# Patient Record
Sex: MALE | Race: WHITE | NOT HISPANIC OR LATINO | URBAN - NONMETROPOLITAN AREA
[De-identification: names, ages, dates, MRNs, and addresses within clinical notes are randomized per-mention and may not be internally consistent; named-entity substitution may affect disease eponyms.]

---

## 2017-05-23 NOTE — PATIENT DISCUSSION
(C18.981) Keratoconjunct sicca, not specified as Sjogren's, bilateral - Assesment : Examination revealed Dry Eye Syndrome - Plan : Monitor for changes. Advised patient to call our office with decreased vision or increased symptoms.  Use artificial tears 2-3 times a day

## 2018-05-24 NOTE — PATIENT DISCUSSION
(D99.186) Keratoconjunct sicca, not specified as Sjogren's, bilateral - Assesment : Examination revealed Dry Eye Syndrome, mild. Patient finds relief from Systane Balance - Plan : Monitor for changes. Advised patient to call our office with decreased vision or increased symptoms.  Continue Systane Balance PRN for comfort  RV 1 year EXAM

## 2018-05-24 NOTE — PATIENT DISCUSSION
(H98.154) Vitreous degeneration, bilateral - Assesment : Examination revealed PVD - Plan : Monitor for changes. Advised patient to call our office with decreased vision or an increase in flashes and/or floaters.

## 2019-01-11 NOTE — PATIENT DISCUSSION
(H10.45) Other chronic allergic conjunctivitis - Assesment : Examination revealed Allergic Conjunctivitis. PATIENT CURRENTLY USING NAPHCON A.  POLYTRIM CAUSED REDNESS. STOP NAPHCON A FEW DAYS BEFORE TESTING.   ADDENDUM: 1/17/19 PAZEO WAS TOO EXPENSIVE RX FOR PATADAY SENT TO PHARMACY - Plan : allergy testing ON MONDAY  USE ARTIFICIAL TEARS,  PF AT DROPS GIVEN TO PATIENT WILL CONSIDER PAZEO IN THE FUTURE IF NEEDED

## 2019-01-11 NOTE — PATIENT DISCUSSION
(M97.219) Keratoconjunct sicca, not specified as Sjogren's, bilateral - Assesment : Examination revealed Dry Eye Syndrome, mild. Patient finds relief from Systane Balance. CURRENTLY USING NAPHCON A. HAS USED POLYTRIM IN THE PAST BUT CAUSED REDNESS PATIENT D/C THESE - Plan : Monitor for changes. Advised patient to call our office with decreased vision or increased symptoms.  Continue Systane Balance PRN for comfort

## 2019-01-14 NOTE — PATIENT DISCUSSION
(H10.45) Other chronic allergic conjunctivitis - Assesment : Pos RXN to: dog epith, green alex, pecan,black oak, box elder, yellow dock, lambs quarter, giant ragweed, alternaria.     ADDENDUM: 1/17/19 PAZEO WAS TOO EXPENSIVE RX FOR PATADAY SENT TO PHARMACY - Plan : PATADAY OU QD .

## 2019-05-29 NOTE — PATIENT DISCUSSION
(S32.603) Keratoconjunct sicca, not specified as Sjogren's, bilateral - Assesment : Examination revealed Dry Eye Syndrome, mild. Patient finds relief from Systane Balance. - Plan : Monitor for changes. Advised patient to call our office with decreased vision or increased symptoms.  Continue Systane Balance PRN for comfort  CONTINUE NAPHCON-A AND PATADAY  1 YEAR EXAM

## 2020-01-14 ENCOUNTER — IMPORTED ENCOUNTER (OUTPATIENT)
Dept: URBAN - NONMETROPOLITAN AREA CLINIC 1 | Facility: CLINIC | Age: 70
End: 2020-01-14

## 2020-01-14 PROBLEM — H25.811: Noted: 2020-01-14

## 2020-01-14 PROBLEM — H25.813: Noted: 2020-01-14

## 2020-01-14 PROBLEM — H01.02A: Noted: 2020-01-14

## 2020-01-14 PROBLEM — H01.024: Noted: 2020-01-14

## 2020-01-14 PROBLEM — H01.021: Noted: 2020-01-14

## 2020-01-14 PROBLEM — H01.02B: Noted: 2020-01-14

## 2020-01-14 PROBLEM — H25.812: Noted: 2020-01-14

## 2020-01-14 PROCEDURE — 92002 INTRM OPH EXAM NEW PATIENT: CPT

## 2020-01-14 PROCEDURE — 92015 DETERMINE REFRACTIVE STATE: CPT

## 2021-02-16 ENCOUNTER — IMPORTED ENCOUNTER (OUTPATIENT)
Dept: URBAN - NONMETROPOLITAN AREA CLINIC 1 | Facility: CLINIC | Age: 71
End: 2021-02-16

## 2021-02-16 PROCEDURE — 99213 OFFICE O/P EST LOW 20 MIN: CPT

## 2021-02-16 PROCEDURE — 92015 DETERMINE REFRACTIVE STATE: CPT

## 2021-02-16 NOTE — PATIENT DISCUSSION
Cataracts OU- Discussed diagnosis in detail with patient- Discussed signs and symptoms of progression- Discussed UV protection- Discussed different treatment options patient states he hasnt not much change - Progression noted today OU - Continue to monitorBlepharitis  OU- Discussed diagnosis in detail with patient- Recommend patient using [employer] baby shampoo to scrub lid daily- Continue to monitorPhytoplasmas OU- Discussed diagnosis in detail with patient - Continue to monitorPresbyopia OU - Discussed diagnosis in detail with patient- New glasses RX given but not much change due to cataract progressing - Continue to monitor

## 2021-06-10 NOTE — PATIENT DISCUSSION
Hx of Ocular myasthenia gravis.  Patient had recent episode of droopy lid likely associated with Myasthenia gravis. Patient states droop resolved in ~3 days then resolved.

## 2022-02-17 ENCOUNTER — FOLLOW UP (OUTPATIENT)
Dept: URBAN - NONMETROPOLITAN AREA CLINIC 1 | Facility: CLINIC | Age: 72
End: 2022-02-17

## 2022-02-17 DIAGNOSIS — H25.813: ICD-10-CM

## 2022-02-17 DIAGNOSIS — H52.4: ICD-10-CM

## 2022-02-17 PROCEDURE — 92015 DETERMINE REFRACTIVE STATE: CPT

## 2022-02-17 PROCEDURE — 99213 OFFICE O/P EST LOW 20 MIN: CPT

## 2022-02-17 ASSESSMENT — VISUAL ACUITY
OS_CC: 20/30+1
OD_BAT: 20/400
OD_CC: 20/30-1
OS_BAT: 20/100

## 2022-02-17 ASSESSMENT — TONOMETRY
OS_IOP_MMHG: 15
OD_IOP_MMHG: 15

## 2022-02-17 NOTE — PATIENT DISCUSSION
Discussed diagnosis in detail with patient. New Glasses RX given today if patient chooses not to proceed with cataract eval. Continue to monitor.

## 2022-02-17 NOTE — PATIENT DISCUSSION
Discussed diagnosis in detail with patient. Discussed signs and symptoms of progression. Discussed UV protection. BAT done today OD 20/400 and OS 20/100. Patient complains of troublewith glare at night time. Progression noted today, recommend cataract eval with Dr. Eileen Prater. Patient wishes to wait at this time.  Continue to monitor.

## 2022-04-10 ASSESSMENT — VISUAL ACUITY
OD_GLARE: 20/32-2
OS_GLARE: 20/50-2
OS_SC: 20/29-2
OU_CC: 20/20
OD_GLARE: 20/400
OS_PH: 20/22-2
OS_GLARE: 20/100
OD_SC: 20/25-
OS_SC: 20/30+2
OD_SC: 20/22+2

## 2022-04-10 ASSESSMENT — TONOMETRY
OD_IOP_MMHG: 16
OS_IOP_MMHG: 16
OD_IOP_MMHG: 16
OS_IOP_MMHG: 16

## 2023-04-03 ENCOUNTER — PRE-OP/H&P (OUTPATIENT)
Dept: URBAN - NONMETROPOLITAN AREA CLINIC 1 | Facility: CLINIC | Age: 73
End: 2023-04-03

## 2023-04-03 VITALS
SYSTOLIC BLOOD PRESSURE: 128 MMHG | HEIGHT: 72 IN | BODY MASS INDEX: 24.11 KG/M2 | DIASTOLIC BLOOD PRESSURE: 84 MMHG | HEART RATE: 57 BPM | WEIGHT: 178 LBS

## 2023-04-03 DIAGNOSIS — Z01.818: ICD-10-CM

## 2023-04-03 PROCEDURE — 99213 OFFICE O/P EST LOW 20 MIN: CPT

## 2023-04-03 ASSESSMENT — VISUAL ACUITY
OS_AM: 20/25
OD_PAM: 20/20
OS_CC: 20/30-1
OD_CC: 20/30

## 2023-04-18 ENCOUNTER — SURGERY/PROCEDURE (OUTPATIENT)
Dept: URBAN - NONMETROPOLITAN AREA CLINIC 1 | Facility: CLINIC | Age: 73
End: 2023-04-18

## 2023-04-18 DIAGNOSIS — H25.812: ICD-10-CM

## 2023-04-18 PROCEDURE — 66984CV REMOVE CATARACT, INSERT LENS, CUSTOM VISION

## 2023-04-18 PROCEDURE — 99199PCV PROF CUSTOM VISION PACKAGE

## 2023-04-18 PROCEDURE — 68841 INSJ RX ELUT IMPLT LAC CANAL: CPT

## 2023-04-18 PROCEDURE — 66999LNX LENSX

## 2023-04-19 ENCOUNTER — POST OP/EVAL OF SECOND EYE (OUTPATIENT)
Dept: URBAN - NONMETROPOLITAN AREA CLINIC 1 | Facility: CLINIC | Age: 73
End: 2023-04-19

## 2023-04-19 DIAGNOSIS — H25.811: ICD-10-CM

## 2023-04-19 PROCEDURE — 99213 OFFICE O/P EST LOW 20 MIN: CPT

## 2023-04-19 ASSESSMENT — TONOMETRY
OD_IOP_MMHG: 16
OS_IOP_MMHG: 18

## 2023-04-19 ASSESSMENT — VISUAL ACUITY
OD_PAM: 20/20
OD_CC: 20/30
OS_SC: 20/30
OD_PH: 20/100
OD_SC: 20/400

## 2023-04-25 ENCOUNTER — SURGERY/PROCEDURE (OUTPATIENT)
Dept: URBAN - NONMETROPOLITAN AREA CLINIC 1 | Facility: CLINIC | Age: 73
End: 2023-04-25

## 2023-04-25 DIAGNOSIS — H25.811: ICD-10-CM

## 2023-04-25 PROCEDURE — 68841 INSJ RX ELUT IMPLT LAC CANAL: CPT

## 2023-04-25 PROCEDURE — 99199PCV PROF CUSTOM VISION PACKAGE

## 2023-04-25 PROCEDURE — 65772LRI LRI DURING CAT SX

## 2023-04-25 PROCEDURE — 66984CV REMOVE CATARACT, INSERT LENS, CUSTOM VISION

## 2023-04-25 PROCEDURE — 66999LNX LENSX

## 2023-04-26 ENCOUNTER — POST-OP (OUTPATIENT)
Dept: URBAN - NONMETROPOLITAN AREA CLINIC 1 | Facility: CLINIC | Age: 73
End: 2023-04-26

## 2023-04-26 DIAGNOSIS — Z98.41: ICD-10-CM

## 2023-04-26 DIAGNOSIS — Z98.42: ICD-10-CM

## 2023-04-26 PROCEDURE — 99024 POSTOP FOLLOW-UP VISIT: CPT

## 2023-04-26 ASSESSMENT — TONOMETRY
OD_IOP_MMHG: 17
OS_IOP_MMHG: 16

## 2023-04-26 ASSESSMENT — VISUAL ACUITY
OD_SC: 20/25
OS_SC: 20/25
OU_SC: 20/20

## 2023-05-02 ENCOUNTER — POST-OP (OUTPATIENT)
Dept: URBAN - NONMETROPOLITAN AREA CLINIC 1 | Facility: CLINIC | Age: 73
End: 2023-05-02

## 2023-05-02 DIAGNOSIS — Z98.41: ICD-10-CM

## 2023-05-02 DIAGNOSIS — Z98.42: ICD-10-CM

## 2023-05-02 PROCEDURE — 99024 POSTOP FOLLOW-UP VISIT: CPT

## 2023-05-02 ASSESSMENT — VISUAL ACUITY
OU_SC: 20/22-2
OD_SC: 20/25-2
OS_SC: 20/25-1

## 2023-05-02 ASSESSMENT — TONOMETRY
OD_IOP_MMHG: 15
OS_IOP_MMHG: 15

## 2023-05-08 ENCOUNTER — FOLLOW UP (OUTPATIENT)
Dept: URBAN - NONMETROPOLITAN AREA CLINIC 1 | Facility: CLINIC | Age: 73
End: 2023-05-08

## 2023-05-08 DIAGNOSIS — Z98.42: ICD-10-CM

## 2023-05-08 DIAGNOSIS — H43.811: ICD-10-CM

## 2023-05-08 DIAGNOSIS — Z98.41: ICD-10-CM

## 2023-05-08 PROCEDURE — 92250 FUNDUS PHOTOGRAPHY W/I&R: CPT

## 2023-05-08 PROCEDURE — 99024 POSTOP FOLLOW-UP VISIT: CPT

## 2023-05-08 ASSESSMENT — TONOMETRY
OS_IOP_MMHG: 14
OD_IOP_MMHG: 15

## 2023-05-08 ASSESSMENT — VISUAL ACUITY
OD_SC: 20/32-
OS_SC: 20/32-

## 2023-05-22 ENCOUNTER — POST-OP (OUTPATIENT)
Dept: URBAN - NONMETROPOLITAN AREA CLINIC 1 | Facility: CLINIC | Age: 73
End: 2023-05-22

## 2023-05-22 DIAGNOSIS — H52.4: ICD-10-CM

## 2023-05-22 DIAGNOSIS — Z98.42: ICD-10-CM

## 2023-05-22 DIAGNOSIS — Z98.41: ICD-10-CM

## 2023-05-22 PROCEDURE — 92015 DETERMINE REFRACTIVE STATE: CPT

## 2023-05-22 PROCEDURE — 99024 POSTOP FOLLOW-UP VISIT: CPT

## 2023-05-22 ASSESSMENT — VISUAL ACUITY
OS_SC: 20/29+
OU_SC: 20/25-
OD_SC: 20/29+

## 2023-05-22 ASSESSMENT — TONOMETRY
OD_IOP_MMHG: 13
OS_IOP_MMHG: 13

## 2023-08-09 ENCOUNTER — FOLLOW UP (OUTPATIENT)
Dept: URBAN - NONMETROPOLITAN AREA CLINIC 1 | Facility: CLINIC | Age: 73
End: 2023-08-09

## 2023-08-09 DIAGNOSIS — H26.493: ICD-10-CM

## 2023-08-09 DIAGNOSIS — Z96.1: ICD-10-CM

## 2023-08-09 PROCEDURE — 99213 OFFICE O/P EST LOW 20 MIN: CPT

## 2023-08-09 ASSESSMENT — VISUAL ACUITY
OD_SC: 20/30
OU_SC: 20/30+2
OS_SC: 20/30+1

## 2023-08-09 ASSESSMENT — TONOMETRY
OS_IOP_MMHG: 13
OD_IOP_MMHG: 12

## 2024-08-12 ENCOUNTER — FOLLOW UP (OUTPATIENT)
Dept: URBAN - NONMETROPOLITAN AREA CLINIC 1 | Facility: CLINIC | Age: 74
End: 2024-08-12

## 2024-08-12 DIAGNOSIS — Z96.1: ICD-10-CM

## 2024-08-12 DIAGNOSIS — H26.493: ICD-10-CM

## 2024-08-12 DIAGNOSIS — H52.4: ICD-10-CM

## 2024-08-12 PROCEDURE — 99213 OFFICE O/P EST LOW 20 MIN: CPT

## 2024-08-12 PROCEDURE — 92015 DETERMINE REFRACTIVE STATE: CPT

## 2024-08-12 ASSESSMENT — VISUAL ACUITY
OD_BAT: 20/50
OD_CC: 20/20
OS_BAT: 20/50
OU_CC: 20/20
OS_CC: 20/20

## 2024-08-12 ASSESSMENT — TONOMETRY
OS_IOP_MMHG: 10
OD_IOP_MMHG: 10

## 2025-08-19 ENCOUNTER — FOLLOW UP (OUTPATIENT)
Age: 75
End: 2025-08-19

## 2025-08-19 DIAGNOSIS — H26.493: ICD-10-CM

## 2025-08-19 DIAGNOSIS — H52.4: ICD-10-CM

## 2025-08-19 DIAGNOSIS — H43.813: ICD-10-CM

## 2025-08-19 PROCEDURE — 92015 DETERMINE REFRACTIVE STATE: CPT

## 2025-08-19 PROCEDURE — 92014 COMPRE OPH EXAM EST PT 1/>: CPT
